# Patient Record
(demographics unavailable — no encounter records)

---

## 2025-04-08 NOTE — DISCUSSION/SUMMARY
[FreeTextEntry1] : 1.  Atrial fibrillation: EKG reviewed sinus rhythm unchanged from prior tracings.  Patient is averse to taking medication in general is trying to wean herself off of meds and total and has been taking Eliquis only daily as opposed to twice daily as currently dosed.  Will therefore switch her to Xarelto 20 mg once a day.  Advised to follow-up with EP for planned repeat AF ablation. 2.  Hyperlipidemia: Suggest coronary calcium score to determine need for statin. 3.  Mitral sufficiency:echo [EKG obtained to assist in diagnosis and management of assessed problem(s)] : EKG obtained to assist in diagnosis and management of assessed problem(s)

## 2025-04-08 NOTE — PHYSICAL EXAM

## 2025-04-08 NOTE — HISTORY OF PRESENT ILLNESS
[FreeTextEntry1] : 67-year-old female with a past medical history of atrial fibrillation status post ablation hyperlipidemia comes in for follow-up.  Since last visit patient did wear a Holter monitor was noted to be in recurrent paroxysmal atrial fibrillation.  Overall, she feels well denies chest pain shortness of breath PND orthopnea.  She did undergo recent blood work LDL cholesterol 167 with an HDL cholesterol of 107 with a lipoprotein a of 12.